# Patient Record
Sex: FEMALE | Race: OTHER | NOT HISPANIC OR LATINO | URBAN - METROPOLITAN AREA
[De-identification: names, ages, dates, MRNs, and addresses within clinical notes are randomized per-mention and may not be internally consistent; named-entity substitution may affect disease eponyms.]

---

## 2021-06-01 ENCOUNTER — INPATIENT (INPATIENT)
Facility: HOSPITAL | Age: 21
LOS: 6 days | Discharge: HOME | End: 2021-06-08
Attending: PSYCHIATRY & NEUROLOGY | Admitting: PSYCHIATRY & NEUROLOGY
Payer: MEDICAID

## 2021-06-01 VITALS
HEART RATE: 68 BPM | RESPIRATION RATE: 16 BRPM | SYSTOLIC BLOOD PRESSURE: 121 MMHG | TEMPERATURE: 98 F | DIASTOLIC BLOOD PRESSURE: 73 MMHG | OXYGEN SATURATION: 100 %

## 2021-06-01 DIAGNOSIS — F32.2 MAJOR DEPRESSIVE DISORDER, SINGLE EPISODE, SEVERE WITHOUT PSYCHOTIC FEATURES: ICD-10-CM

## 2021-06-01 LAB
ANION GAP SERPL CALC-SCNC: 13 MMOL/L — SIGNIFICANT CHANGE UP (ref 7–14)
APAP SERPL-MCNC: <15 UG/ML — SIGNIFICANT CHANGE UP (ref 15–25)
APPEARANCE UR: ABNORMAL
BACTERIA # UR AUTO: ABNORMAL
BASOPHILS # BLD AUTO: 0.03 K/UL — SIGNIFICANT CHANGE UP (ref 0–0.2)
BASOPHILS NFR BLD AUTO: 0.4 % — SIGNIFICANT CHANGE UP (ref 0–2)
BILIRUB UR-MCNC: NEGATIVE — SIGNIFICANT CHANGE UP
BUN SERPL-MCNC: 12 MG/DL — SIGNIFICANT CHANGE UP (ref 7–23)
CALCIUM SERPL-MCNC: 9.2 MG/DL — SIGNIFICANT CHANGE UP (ref 8.4–10.5)
CHLORIDE SERPL-SCNC: 104 MMOL/L — SIGNIFICANT CHANGE UP (ref 98–107)
CO2 SERPL-SCNC: 22 MMOL/L — SIGNIFICANT CHANGE UP (ref 22–31)
COLOR SPEC: YELLOW — SIGNIFICANT CHANGE UP
CREAT SERPL-MCNC: 0.64 MG/DL — SIGNIFICANT CHANGE UP (ref 0.5–1.3)
DIFF PNL FLD: ABNORMAL
EOSINOPHIL # BLD AUTO: 0.15 K/UL — SIGNIFICANT CHANGE UP (ref 0–0.5)
EOSINOPHIL NFR BLD AUTO: 2.1 % — SIGNIFICANT CHANGE UP (ref 0–6)
EPI CELLS # UR: SIGNIFICANT CHANGE UP /HPF (ref 0–5)
GLUCOSE SERPL-MCNC: 83 MG/DL — SIGNIFICANT CHANGE UP (ref 70–99)
GLUCOSE UR QL: NEGATIVE — SIGNIFICANT CHANGE UP
HCG SERPL-ACNC: <5 MIU/ML — SIGNIFICANT CHANGE UP
HCT VFR BLD CALC: 40.5 % — SIGNIFICANT CHANGE UP (ref 34.5–45)
HGB BLD-MCNC: 13.4 G/DL — SIGNIFICANT CHANGE UP (ref 11.5–15.5)
IANC: 4.21 K/UL — SIGNIFICANT CHANGE UP (ref 1.5–8.5)
IMM GRANULOCYTES NFR BLD AUTO: 0.3 % — SIGNIFICANT CHANGE UP (ref 0–1.5)
KETONES UR-MCNC: ABNORMAL
LEUKOCYTE ESTERASE UR-ACNC: NEGATIVE — SIGNIFICANT CHANGE UP
LYMPHOCYTES # BLD AUTO: 2.26 K/UL — SIGNIFICANT CHANGE UP (ref 1–3.3)
LYMPHOCYTES # BLD AUTO: 31 % — SIGNIFICANT CHANGE UP (ref 13–44)
MCHC RBC-ENTMCNC: 27.9 PG — SIGNIFICANT CHANGE UP (ref 27–34)
MCHC RBC-ENTMCNC: 33.1 GM/DL — SIGNIFICANT CHANGE UP (ref 32–36)
MCV RBC AUTO: 84.2 FL — SIGNIFICANT CHANGE UP (ref 80–100)
MONOCYTES # BLD AUTO: 0.63 K/UL — SIGNIFICANT CHANGE UP (ref 0–0.9)
MONOCYTES NFR BLD AUTO: 8.6 % — SIGNIFICANT CHANGE UP (ref 2–14)
NEUTROPHILS # BLD AUTO: 4.21 K/UL — SIGNIFICANT CHANGE UP (ref 1.8–7.4)
NEUTROPHILS NFR BLD AUTO: 57.6 % — SIGNIFICANT CHANGE UP (ref 43–77)
NITRITE UR-MCNC: NEGATIVE — SIGNIFICANT CHANGE UP
NRBC # BLD: 0 /100 WBCS — SIGNIFICANT CHANGE UP
NRBC # FLD: 0 K/UL — SIGNIFICANT CHANGE UP
PH UR: 6.5 — SIGNIFICANT CHANGE UP (ref 5–8)
PLATELET # BLD AUTO: 202 K/UL — SIGNIFICANT CHANGE UP (ref 150–400)
POTASSIUM SERPL-MCNC: 4.3 MMOL/L — SIGNIFICANT CHANGE UP (ref 3.5–5.3)
POTASSIUM SERPL-SCNC: 4.3 MMOL/L — SIGNIFICANT CHANGE UP (ref 3.5–5.3)
PROT UR-MCNC: ABNORMAL
RBC # BLD: 4.81 M/UL — SIGNIFICANT CHANGE UP (ref 3.8–5.2)
RBC # FLD: 13.2 % — SIGNIFICANT CHANGE UP (ref 10.3–14.5)
RBC CASTS # UR COMP ASSIST: SIGNIFICANT CHANGE UP /HPF (ref 0–4)
SALICYLATES SERPL-MCNC: <5 MG/DL — LOW (ref 15–30)
SODIUM SERPL-SCNC: 139 MMOL/L — SIGNIFICANT CHANGE UP (ref 135–145)
SP GR SPEC: 1.03 — HIGH (ref 1.01–1.02)
UROBILINOGEN FLD QL: ABNORMAL
WBC # BLD: 7.3 K/UL — SIGNIFICANT CHANGE UP (ref 3.8–10.5)
WBC # FLD AUTO: 7.3 K/UL — SIGNIFICANT CHANGE UP (ref 3.8–10.5)

## 2021-06-01 PROCEDURE — 99284 EMERGENCY DEPT VISIT MOD MDM: CPT | Mod: 25

## 2021-06-01 PROCEDURE — 99285 EMERGENCY DEPT VISIT HI MDM: CPT

## 2021-06-01 PROCEDURE — 93010 ELECTROCARDIOGRAM REPORT: CPT

## 2021-06-01 NOTE — ED ADULT TRIAGE NOTE - CHIEF COMPLAINT QUOTE
SI with no plan states attempted to cute herself with nailfile on a clipper two nights ago on her thigh, endorses only scratching herself, no active bleeding. Denies alcohol, drug abuse. Hx Social Anxiety, Depression
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
## 2021-06-01 NOTE — ED BEHAVIORAL HEALTH ASSESSMENT NOTE - OTHER PAST PSYCHIATRIC HISTORY (INCLUDE DETAILS REGARDING ONSET, COURSE OF ILLNESS, INPATIENT/OUTPATIENT TREATMENT)
Pt not currently in outpatient treatment. Has been dx with depression, anxiety and r/o bipolar disorder.  No past psych hospitalizations.  History of SI with preparatory actions and plan to OD on pills at age 14 - stopped herself when she thought about her family.  History of NSSIB ages [age] by cutting and again recently 2 days ago by cutting.  Pt has seen therapists on and off since age 15, last at age 19. Stopped because therapist wanted to explore idea of bipolar disorder, which scared her.  Pt evaluated by psychiatrists in the past (last at age 18), but was never on psychiatric medications as she declined. Pt not currently in outpatient treatment. Has been diagnosed with depression, anxiety and r/o bipolar disorder.  No past psych hospitalizations.  History of SI with preparatory actions and plan to OD on pills at age 14 - stopped herself when she thought about her family.  History of NSSIB ages [age] by cutting and again recently 2 days ago by cutting.  Pt has seen therapists on and off since age 15, last at age 19. Stopped because therapist wanted to explore idea of bipolar disorder, which scared her.  Pt evaluated by psychiatrists in the past (last at age 18), but was never on psychiatric medications as she declined.

## 2021-06-01 NOTE — ED ADULT NURSE NOTE - OBJECTIVE STATEMENT
pt came in via walk-in triage for c/o worsening depression anxiety, with recent self-injurious behavior. pt states she cut herself with a nail clipper recently after break up with boyfriend. denies si, states she does not want to die, just want to hurt herself. denies hi,ah,vh,etoh, drugs. states she want psych admission
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
## 2021-06-01 NOTE — CHART NOTE - NSCHARTNOTEFT_GEN_A_CORE
COVID Exposure Screen- Patient    1.	*In the past 14 days, have you been around anyone with a positive COVID-19 test?*   (  ) Yes   (X) No   (  ) Unknown- Reason (e.g. patient uncertain, sedated, refusing to answer, etc.):  ______  IF YES PROCEED TO QUESTION #2. IF NO or UNKNOWN, PLEASE SKIP TO QUESTION #7  2.	Were you within 6 feet of them for at least 15 minutes? (  ) Yes   (  ) No   (  ) Unknown- Reason: ______    3.	Have you provided care for them? (  ) Yes   (  ) No   (  ) Unknown- Reason: ______    4.	Have you had direct physical contact with them (touched, hugged, or kissed them)? (  ) Yes   (  ) No    (  ) Unknown- Reason: ______    5.	Have you shared eating or drinking utensils with them? (  ) Yes   (  ) No    (  ) Unknown- Reason: ______    6.	Have they sneezed, coughed, or somehow got respiratory droplets on you? (  ) Yes   (  ) No    (  ) Unknown- Reason: ______    7.	*Have you been out of [place] within the past 14 days?*  (  ) Yes   (X) No   (  ) Unknown- Reason (e.g. patient uncertain, sedated, refusing to answer, etc.): _______  IF YES PLEASE ANSWER THE FOLLOWING QUESTIONS:  8.	Which state/country have you been to? ______   9.	Were you there over 24 hours? (  ) Yes   (  ) No    (  ) Unknown- Reason: ______    10.	What date did you return to [facility]? ______         COVID Exposure Screen- collateral (i.e. third-party, chart review, belongings, etc; include EMS and ED staff)    1.	*In the past 14 days, has the patient been around anyone with a positive COVID-19 test?*   (  ) Yes   (X) No   (  ) Unknown- Reason (e.g. collateral uncertain, refusing to answer, etc.):  ______  IF YES PROCEED TO QUESTION #2. IF NO or UNKNOWN, PLEASE SKIP TO QUESTION #7  2.	Was the patient within 6 feet of them for at least 15 minutes? (  ) Yes   (  ) No   (  ) Unknown- Reason: ______    3.	Did the patient provide care for them? (  ) Yes   (  ) No   (  ) Unknown- Reason: ______    4.	Did the patient have direct physical contact with them (touched, hugged, or kissed them)? (  ) Yes   (  ) No    (  ) Unknown- Reason: ______    5.	Did the patient share eating or drinking utensils with them? (  ) Yes   (  ) No    (  ) Unknown- Reason: ______    6.	Have they sneezed, coughed, or somehow got respiratory droplets on the patient? (  ) Yes   (  ) No    (  ) Unknown- Reason: ______    7.	*Has the patient been out of [place] within the past 14 days?*  (  ) Yes   (X) No   (  ) Unknown- Reason (e.g. collateral uncertain, refusing to answer, etc.): _______  IF YES PLEASE ANSWER THE FOLLOWING QUESTIONS:  8.	Which state/country has the patient been to? ______   9.	Was the patient there over 24 hours? (  ) Yes   (  ) No    (  ) Unknown- Reason: ______    10.	What date did the patient return to [facility]? ______

## 2021-06-01 NOTE — ED BEHAVIORAL HEALTH ASSESSMENT NOTE - PSYCHIATRIC ISSUES AND PLAN (INCLUDE STANDING AND PRN MEDICATION)
Defer standing meds to primary team. Melatonin 3mg QHS PRN insomnia. Hydroxyzine 50mg q6h PRN anxiety. Ativan 2mg PO/IM q6h PRN severe anxiety/agitation.
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
## 2021-06-01 NOTE — ED PROVIDER NOTE - PROGRESS NOTE DETAILS
HANNA: I was signed out pending labs and medical clearance for admission to [facility] for SI. Covid NEG. HANNA: Pt has no complaints. Still awaiting bed availability at [facility]. Will continue to monitor. Cisco PERALES: Patient signed out pending psych admission. Plan to admit to [facility], 1[facility] - Dr. Kimberly Raymundo

## 2021-06-02 DIAGNOSIS — F32.9 MAJOR DEPRESSIVE DISORDER, SINGLE EPISODE, UNSPECIFIED: ICD-10-CM

## 2021-06-02 LAB
COVID-19 SPIKE DOMAIN AB INTERP: NEGATIVE — SIGNIFICANT CHANGE UP
COVID-19 SPIKE DOMAIN AB INTERP: NEGATIVE — SIGNIFICANT CHANGE UP
COVID-19 SPIKE DOMAIN ANTIBODY RESULT: 0.4 U/ML — SIGNIFICANT CHANGE UP
COVID-19 SPIKE DOMAIN ANTIBODY RESULT: 0.4 U/ML — SIGNIFICANT CHANGE UP
PCP SPEC-MCNC: SIGNIFICANT CHANGE UP
SARS-COV-2 IGG+IGM SERPL QL IA: 0.4 U/ML — SIGNIFICANT CHANGE UP
SARS-COV-2 IGG+IGM SERPL QL IA: 0.4 U/ML — SIGNIFICANT CHANGE UP
SARS-COV-2 IGG+IGM SERPL QL IA: NEGATIVE — SIGNIFICANT CHANGE UP
SARS-COV-2 IGG+IGM SERPL QL IA: NEGATIVE — SIGNIFICANT CHANGE UP
SARS-COV-2 RNA SPEC QL NAA+PROBE: SIGNIFICANT CHANGE UP

## 2021-06-02 PROCEDURE — 90832 PSYTX W PT 30 MINUTES: CPT

## 2021-06-02 PROCEDURE — 99213 OFFICE O/P EST LOW 20 MIN: CPT

## 2021-06-02 PROCEDURE — 99222 1ST HOSP IP/OBS MODERATE 55: CPT

## 2021-06-02 RX ORDER — LANOLIN ALCOHOL/MO/W.PET/CERES
3 CREAM (GRAM) TOPICAL AT BEDTIME
Refills: 0 | Status: DISCONTINUED | OUTPATIENT
Start: 2021-06-02 | End: 2021-06-03

## 2021-06-02 RX ORDER — ALBUTEROL 90 UG/1
2 AEROSOL, METERED ORAL EVERY 6 HOURS
Refills: 0 | Status: DISCONTINUED | OUTPATIENT
Start: 2021-06-02 | End: 2021-06-08

## 2021-06-02 RX ADMIN — Medication 3 MILLIGRAM(S): at 21:47

## 2021-06-02 NOTE — BH INPATIENT PSYCHIATRY ASSESSMENT NOTE - NSICDXBHPRIMARYDX_PSY_ALL_CORE
Current severe episode of major depressive disorder without psychotic features without prior episode   F32.2
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
## 2021-06-02 NOTE — BH INPATIENT PSYCHIATRY ASSESSMENT NOTE - OTHER PAST PSYCHIATRIC HISTORY (INCLUDE DETAILS REGARDING ONSET, COURSE OF ILLNESS, INPATIENT/OUTPATIENT TREATMENT)
Pt not currently in outpatient treatment. Has been diagnosed with depression, anxiety and r/o bipolar disorder.  No past psych hospitalizations.  History of SI with preparatory actions and plan to OD on pills at age 14 - stopped herself when she thought about her family.  History of NSSIB ages [age] by cutting and again recently 2 days ago by cutting.  Pt has seen therapists on and off since age 15, last at age 19. Stopped because therapist wanted to explore idea of bipolar disorder, which scared her.  Pt evaluated by psychiatrists in the past (last at age 18), but was never on psychiatric medications as she declined.

## 2021-06-02 NOTE — ED ADULT NURSE REASSESSMENT NOTE - NS ED NURSE REASSESS COMMENT FT1
Received pt from RN break coverage. Pt is sleeping in bed, NAD, even unlabored respirations observed. VSS. Will continue to monitor for safety.

## 2021-06-02 NOTE — ED ADULT NURSE REASSESSMENT NOTE - NS ED NURSE REASSESS COMMENT FT1
received pt from previous shift. pt is resting well in  room 4. still boarding for a psych bed. no issues noted.

## 2021-06-02 NOTE — BH CONSULTATION LIAISON PROGRESS NOTE - NSBHCHARTREVIEWVS_PSY_A_CORE FT
Vital Signs Last 24 Hrs  T(C): 36.8 (02 Jun 2021 00:59), Max: 36.8 (01 Jun 2021 17:25)  T(F): 98.2 (02 Jun 2021 00:59), Max: 98.2 (01 Jun 2021 17:25)  HR: 87 (02 Jun 2021 08:02) (68 - 87)  BP: 126/83 (02 Jun 2021 08:02) (99/55 - 126/83)  BP(mean): --  RR: 15 (02 Jun 2021 08:02) (14 - 16)  SpO2: 98% (02 Jun 2021 08:02) (98% - 100%)

## 2021-06-02 NOTE — BH INPATIENT PSYCHIATRY ASSESSMENT NOTE - CURRENT MEDICATION
MEDICATIONS  (STANDING):  melatonin. 3 milliGRAM(s) Oral at bedtime    MEDICATIONS  (PRN):  ALBUTerol    90 MICROgram(s) HFA Inhaler 2 Puff(s) Inhalation every 6 hours PRN Wheezing  LORazepam     Tablet 2 milliGRAM(s) Oral every 6 hours PRN Agitation  LORazepam   Injectable 2 milliGRAM(s) IntraMuscular once PRN severe agitation

## 2021-06-02 NOTE — BH INPATIENT PSYCHIATRY ASSESSMENT NOTE - NSBHASSESSSUMMFT_PSY_ALL_CORE
Patient presents with worsening depression, SI and thoughts to self-harm.  Patient is voluntary, happy to be coming in for treatment, denies any SI in the hospital.  The patient is agreeable for group and individual therapy, but is declining medications at this time.  -Continue to evaluate for need for medications  -Group and individual therapy

## 2021-06-02 NOTE — BH INPATIENT PSYCHIATRY ASSESSMENT NOTE - NSBHCHARTREVIEWVS_PSY_A_CORE FT
Vital Signs Last 24 Hrs  T(C): 36.6 (02 Jun 2021 13:24), Max: 36.8 (01 Jun 2021 17:25)  T(F): 97.8 (02 Jun 2021 13:24), Max: 98.2 (01 Jun 2021 17:25)  HR: 67 (02 Jun 2021 13:24) (67 - 87)  BP: 105/58 (02 Jun 2021 13:24) (99/55 - 126/83)  BP(mean): --  RR: 16 (02 Jun 2021 13:24) (14 - 16)  SpO2: 100% (02 Jun 2021 13:24) (98% - 100%)

## 2021-06-02 NOTE — BH INPATIENT PSYCHIATRY ASSESSMENT NOTE - MSE UNSTRUCTURED FT
The patient appears stated age, fair hygiene, dressed appropriately.  She was calm, cooperative with the interview.  She maintained appropriate eye contact.  No psychomotor agitation or retardation.  Steady gait.  The patient’s speech was fluent, normal in tone, rate, and volume.  The patient’s mood is “depressed.”  Affect is constricted, dysphoric, stable, appropriate.  The patient’s thoughts are goal directed.  She denies any delusions or hallucinations.  She admits to recent suicidal and self-injurious thoughts, denies any current plan or intent in the hospital.  No homicidal ideation, intent, or plan.  Insight is fair.  Judgment is fair.  Impulse control has been fair on the unit.
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
## 2021-06-02 NOTE — BH INPATIENT PSYCHIATRY ASSESSMENT NOTE - NSTXRELFACTOR_PSY_ALL_CORE
I, Teto Garibay MD,  performed the initial face to face bedside interview with this patient regarding history of present illness, review of symptoms and relevant past medical, social and family history.  I completed an independent physical examination.  I was the initial provider who evaluated this patient. I have signed out the follow up of any pending tests (i.e. labs, radiological studies) to the ACP.  I have communicated the patient’s plan of care and disposition with the ACP. Non-compliant or not receiving treatment

## 2021-06-03 PROCEDURE — 99232 SBSQ HOSP IP/OBS MODERATE 35: CPT

## 2021-06-03 PROCEDURE — 90853 GROUP PSYCHOTHERAPY: CPT

## 2021-06-03 RX ORDER — LANOLIN ALCOHOL/MO/W.PET/CERES
5 CREAM (GRAM) TOPICAL AT BEDTIME
Refills: 0 | Status: DISCONTINUED | OUTPATIENT
Start: 2021-06-03 | End: 2021-06-08

## 2021-06-03 RX ORDER — MULTIVIT-MIN/FERROUS GLUCONATE 9 MG/15 ML
1 LIQUID (ML) ORAL DAILY
Refills: 0 | Status: DISCONTINUED | OUTPATIENT
Start: 2021-06-03 | End: 2021-06-08

## 2021-06-03 RX ADMIN — Medication 5 MILLIGRAM(S): at 20:46

## 2021-06-03 NOTE — DIETITIAN INITIAL EVALUATION ADULT. - OTHER INFO
Patient admitted to [facility] d/t depression/anxiety. Patient states she feels hungry but does not have an appetite. Patient admitted to [facility] d/t depression/anxiety. Patient states she feels hungry but has not had an appetite. Reports her appetite has somewhat improved and consumed a good amount of breakfast this morning. Reports she had weighed 150# last year and had some weight loss ; and then lost 20# in the past few months. Reports weighs currently 104#. States she has constipation; encouraged fiber and fluid intake. Food preferences obtained and implemented.
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
## 2021-06-03 NOTE — BH SOCIAL WORK INITIAL PSYCHOSOCIAL EVALUATION - NSBHHOUSECOMMENTFT_PSY_ALL_CORE
Patient lives with her mother, grandmother, siblings, father recently went to [facility] and it is unclear if he will return.  None of the issues below were reported.

## 2021-06-03 NOTE — BH PSYCHOLOGY - GROUP THERAPY NOTE - NSPSYCHOLGRPDBTPT_PSY_A_CORE
stable mood and affect in group/patient showing good behavior control/Patient able to identify mood states/other...

## 2021-06-04 PROCEDURE — 99232 SBSQ HOSP IP/OBS MODERATE 35: CPT

## 2021-06-04 PROCEDURE — 90834 PSYTX W PT 45 MINUTES: CPT

## 2021-06-04 RX ADMIN — Medication 1 TABLET(S): at 08:40

## 2021-06-04 RX ADMIN — Medication 5 MILLIGRAM(S): at 21:53

## 2021-06-04 NOTE — BH TREATMENT PLAN - NSBHPRIMARYDX_PSY_ALL_CORE
Current severe episode of major depressive disorder without psychotic features without prior episode

## 2021-06-04 NOTE — BH INPATIENT PSYCHIATRY PROGRESS NOTE - MSE UNSTRUCTURED FT
The patient appears stated age, fair hygiene, dressed appropriately.  She was calm, cooperative with the interview.  She maintained appropriate eye contact.  No psychomotor agitation or retardation.  Steady gait.  The patient’s speech was fluent, normal in tone, rate, and volume.  The patient’s mood is “a little better.”  Affect is constricted, stable, appropriate.  The patient’s thoughts are goal directed.  She denies any delusions or hallucinations.  She admits to recent suicidal and self-injurious thoughts, denies any current plan or intent in the hospital.  No homicidal ideation, intent, or plan.  Insight is fair.  Judgment is fair.  Impulse control has been fair on the unit.

## 2021-06-05 PROCEDURE — 99232 SBSQ HOSP IP/OBS MODERATE 35: CPT

## 2021-06-05 RX ORDER — HYDROXYZINE HCL 10 MG
25 TABLET ORAL EVERY 4 HOURS
Refills: 0 | Status: DISCONTINUED | OUTPATIENT
Start: 2021-06-05 | End: 2021-06-08

## 2021-06-05 RX ORDER — HYDROXYZINE HCL 10 MG
50 TABLET ORAL ONCE
Refills: 0 | Status: COMPLETED | OUTPATIENT
Start: 2021-06-05 | End: 2021-06-05

## 2021-06-05 RX ADMIN — Medication 50 MILLIGRAM(S): at 15:26

## 2021-06-05 RX ADMIN — Medication 1 TABLET(S): at 08:55

## 2021-06-05 NOTE — BH INPATIENT PSYCHIATRY PROGRESS NOTE - MSE UNSTRUCTURED FT
The patient appears stated age, fair hygiene, dressed appropriately.  She was calm, cooperative with the interview.  She maintained appropriate eye contact.  No psychomotor agitation or retardation.   The patient’s speech was fluent, normal in tone, rate, and volume.  mood is “a little better.”  Affect is constricted, stable, appropriate.  The patient’s thoughts are goal directed.  She denies any delusions or hallucinations.  She admits to recent suicidal and self-injurious thoughts, denies any current plan or intent in the hospital.  No homicidal ideation, intent, or plan.  Insight is fair.  Judgment is fair.  Impulse control has been good .

## 2021-06-06 PROCEDURE — 99232 SBSQ HOSP IP/OBS MODERATE 35: CPT

## 2021-06-06 RX ADMIN — Medication 5 MILLIGRAM(S): at 21:11

## 2021-06-06 RX ADMIN — Medication 1 TABLET(S): at 09:55

## 2021-06-06 RX ADMIN — Medication 25 MILLIGRAM(S): at 21:12

## 2021-06-06 NOTE — BH INPATIENT PSYCHIATRY PROGRESS NOTE - MSE UNSTRUCTURED FT
The patient appears stated age, fair hygiene, dressed appropriately.  She was calm, cooperative with the interview.  She maintained appropriate eye contact.  No psychomotor agitation or retardation.   The patient’s speech was fluent, normal in tone, rate, and volume.  mood is “a little better.”  Affect is constricted, stable, appropriate.  The patient’s thoughts are goal directed.  She denies any delusions or hallucinations.  She admits to recent suicidal and self-injurious thoughts, denies any current plan or intent in the hospital.  No homicidal ideation, intent, or plan.  Insight is fair.  Judgment is fair.  Impulse control has been good .  The patient appears stated age, fair hygiene, dressed appropriately.  She was calm, cooperative with the interview.  She maintained appropriate eye contact.  No psychomotor agitation or retardation.   The patient’s speech was fluent, normal in tone, rate, and volume.  mood remains “ better.”  Affect is constricted, stable, appropriate.  The patient’s thoughts are goal directed.  She denies any delusions or hallucinations.  She admits to recent suicidal and self-injurious thoughts, denies any current plan or intent in the hospital.  No homicidal ideation, intent, or plan.  Insight is fair.  Judgment is fair.  Impulse control has been good .

## 2021-06-07 PROCEDURE — 90853 GROUP PSYCHOTHERAPY: CPT

## 2021-06-07 PROCEDURE — 90832 PSYTX W PT 30 MINUTES: CPT | Mod: 59

## 2021-06-07 PROCEDURE — 99232 SBSQ HOSP IP/OBS MODERATE 35: CPT | Mod: 25

## 2021-06-07 RX ADMIN — Medication 1 TABLET(S): at 10:18

## 2021-06-07 RX ADMIN — Medication 5 MILLIGRAM(S): at 21:23
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
## 2021-06-07 NOTE — BH PSYCHOLOGY - CLINICIAN PSYCHOTHERAPY NOTE - NSBHPSYCHOLGOALS_PSY_A_CORE
Decrease symptoms/Improve level of independent functioning/Improve social/vocational/coping skills/Prevent relapse/Psychoeducation/Treatment compliance
Decrease symptoms/Improve level of independent functioning/Improve social/vocational/coping skills/Prevent relapse/Psychoeducation
Decrease symptoms/Assessment/Improve level of independent functioning/Improve social/vocational/coping skills/Prevent relapse/Psychoeducation/Treatment compliance

## 2021-06-07 NOTE — BH PSYCHOLOGY - CLINICIAN PSYCHOTHERAPY NOTE - NSBHPSYCHOLINT_PSY_A_CORE
Dialectical  Behavioral Therapy (DBT)/Supported coping skills/Treatment compliance encouraged
Dialectical  Behavioral Therapy (DBT)/Supported coping skills
Dialectical  Behavioral Therapy (DBT)/Supported coping skills

## 2021-06-07 NOTE — BH PSYCHOLOGY - GROUP THERAPY NOTE - NSPSYCHOLGRPDBTPT_PSY_A_CORE FT
DBT Group is a group in which patients learn skills to better manage their emotions and behaviors. Group begins with a mindfulness practice so that patients have an opportunity to practice observing their internal and external experiences.  Following the mindfulness exercise the group learns new skills in a didactic format. Group today focused on the “distress tolerance” module, which are skills to help patients manage their crisis urges.  Specifically, pts learned the skill of “radical acceptance,” which includes accepting painful situations in their lives they cannot change through turning the mind and practicing willingness. Patients were asked to determine difficult situations in their lives they needed to work on accepting, and provided examples of ways to practice this while in the hospital.    
DBT Group is a group in which patients learn skills to better manage their emotions and behaviors. Group begins with a mindfulness practice so that patients have an opportunity to practice observing their internal and external experiences.  Following the mindfulness exercise the group learns new skills in a didactic format. Group today focused on the “emotion regulation” module.  Specifically, patients learned about the skill of Problem Solving, which is a method of managing difficult situations when an emotion is justified by the situation.  Patients were taught the seven steps of problem solving and provided with an example of a situation in which the skill would be useful.  Patients were also asked to think about when the skill would be helpful in their lives and how to apply the steps. Patients were also provided with a worksheet in order to help them practice the skill.

## 2021-06-07 NOTE — BH PSYCHOLOGY - GROUP THERAPY NOTE - NSPSYCHOLGRPDBTGOAL_PSY_A_CORE
reduce mood and affective lability/reduce impulsive self-defeating behavior/reduce self-injurious behavior/reduce suicidal ideation/risk of attempt/improve ability to indentify feelings/improve ability to communicate feelings
reduce impulsive self-defeating behavior/reduce suicidal ideation/risk of attempt/improve ability to indentify feelings/improve ability to communicate feelings/reduce vulnerability to emotional dysregualation

## 2021-06-07 NOTE — BH PSYCHOLOGY - CLINICIAN PSYCHOTHERAPY NOTE - NSBHPSYCHOLNARRATIVE_PSY_A_CORE FT
Writer met with patient for individual therapy session at the recommendation of the treatment team. Pt was alert, oriented x4 and cooperative with session. Pt denied SHIIP and AVH. Pt reports mood as "okay" and affect is euthymic, congruent and full-range. No PMA or PMR is observed and speech is of normal rate, rhythm and volume. Pt maintains appropriate eye contact. Impulse control is intact and pt's insight and judgment are limited. Focus of session was on generating safety plan and coping ahead for effective discharge. With encouragement, pt identified warning signs, coping skills, and external supports that she can use to maintain safety and stability outside of the hospital after discharge. Discussion also centered on how pt can help keep the environment safe and she committed to giving sharps to her mother to remove triggers in her environment. Please see  Safety Plan for further detail.    
Writer met with patient for individual therapy session at the recommendation of the treatment team. Pt was alert, oriented x4 and cooperative with session. Pt denied HIIP and AVH. Pt endorses passive SI and denies plan or intent. Pt endorses mild SIB urges with no plan or intent. Pt reports mood as "depressed" and affect is congruent, tearful as times appropriate to content. Some PMA is observed and speech is of normal rate, rhythm and volume. Pt maintains appropriate eye contact. Impulse control is intact and pt's insight and judgment are limited. Focus of session is on conducting behavior chain analysis on events leading to current hospitalization. Pt shares that she recently ended a long toxic relationship with boyfriend with whom she became pregnant and had two abortions as a result, one of which was within last couple of months. Pt shares that they often argue and that recently she has been unable to cope with the frustration, guilt and shame she experiences. Pt endorses history of SIB via cutting, head-banging and punching things which required medical attention one time due to ruptures blood vessel in her hand. Pt explains that she had an argument with bf a couple of days before coming to the hospital which led to passive SI. Validation, psychoeducation and solution analysis are implemented. Pt is apprised of CAPS self-harm 1 [facility] protocol and commits to safety on the unit. Pt is oriented to DBT Diary Card and agrees to track urges, target behaviors and use of DBT skills.      
Writer met with patient for individual therapy session at the recommendation of the treatment team. Pt was alert, oriented x4 and cooperative with session. Pt denied SHIIP and AVH. She endorses passive SIB urges and states that if she were home she likely would have acted on these. Pt reports mood as "sad" and affect is congruent and full-range. No PMA or PMR is observed and speech is of normal rate, rhythm and volume. Pt maintains appropriate eye contact. Impulse control is intact and pt's insight and judgment are limited. Focus of session is on reviewing diary card and further conducting chain analysis on events leading to hospitalization. Pt shares details of her relationship with ex-bf, who she spoke to on the phone on Weds and Thursday after which conversations she had SIB urges. Pt discusses pattern of relationship by which they begin dating, he cheats on her, she becomes angry with him and they break up, he apologizes and makes new overtures, she takes him back and it continues as such. Pt shares that she often feels guilty and confused, or as though, "there must be something wrong with me which is why he is not faithful." Pt discusses feeling judgmental of herself and states that she feels "addicted" to the ex. Validation and psychoeducation about healthy relationships, boundaries, and trauma bonds are provided. Skills that pt can use to regulate herself when she feels rejected or angry are discussed and practiced. These include self-validation, radical acceptance, opposite action, diaphragmatic breathing and mindfully observing.

## 2021-06-07 NOTE — BH INPATIENT PSYCHIATRY PROGRESS NOTE - MSE UNSTRUCTURED FT
The patient appears stated age, fair hygiene, dressed appropriately.  She was calm, cooperative with the interview.  She maintained appropriate eye contact.  No psychomotor agitation or retardation.   The patient’s speech was fluent, normal in tone, rate, and volume.  mood remains “ better” and "I want discharge."  Affect is constricted, stable, appropriate.  The patient’s thoughts are goal directed.  She denies any delusions or hallucinations.  She adamantly denies any suicidal or homicidal ideation, intent, or plan.  Insight is fair.  Judgment is fair.  Impulse control has been good .

## 2021-06-08 VITALS — DIASTOLIC BLOOD PRESSURE: 75 MMHG | HEART RATE: 72 BPM | TEMPERATURE: 98 F | SYSTOLIC BLOOD PRESSURE: 115 MMHG

## 2021-06-08 PROCEDURE — 99238 HOSP IP/OBS DSCHRG MGMT 30/<: CPT

## 2021-06-08 RX ADMIN — Medication 1 TABLET(S): at 08:45

## 2021-06-08 NOTE — BH INPATIENT PSYCHIATRY PROGRESS NOTE - MSE OPTIONS
Unstructured MSE

## 2021-06-08 NOTE — BH DISCHARGE NOTE NURSING/SOCIAL WORK/PSYCH REHAB - MODE OF TRANSPORTATION
Ambulatory Pt will be picked up by family taken back to [address] [place] [address], [place]/Ambulatory

## 2021-06-08 NOTE — BH INPATIENT PSYCHIATRY DISCHARGE NOTE - CASE SUMMARY
Agree with above hospital course.  Patient refused medications but was engaged in therapy and showed improvement in symptoms.  No SI.  No longer an acute danger to herself or others, discharged on 3 day letter.

## 2021-06-08 NOTE — BH INPATIENT PSYCHIATRY PROGRESS NOTE - MSE UNSTRUCTURED FT
The patient appears stated age, fair hygiene, dressed appropriately.  She was calm, cooperative with the interview.  She maintained appropriate eye contact.  No psychomotor agitation or retardation.   The patient’s speech was fluent, normal in tone, rate, and volume.  mood remains “ better”   Affect is brighter, stable, appropriate.  The patient’s thoughts are goal directed.  She denies any delusions or hallucinations.  She adamantly denies any suicidal or homicidal ideation, intent, or plan.  Insight is fair.  Judgment is fair.  Impulse control has been good .

## 2021-06-08 NOTE — BH INPATIENT PSYCHIATRY PROGRESS NOTE - NSTXDCOPLKGOAL_PSY_ALL_CORE
Will agree to consider an appropriate level of outpatient care
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
## 2021-06-08 NOTE — BH INPATIENT PSYCHIATRY DISCHARGE NOTE - NSBHDCHANDOFFFT_PSY_ALL_CORE
Pt referred to [facility] and has not yet been assigned a provider. Clinic was faxed discharge summary, medication list, and this provider's number [phone] for any questions or concerns

## 2021-06-08 NOTE — BH INPATIENT PSYCHIATRY PROGRESS NOTE - NSTXSUICIDGOAL_PSY_ALL_CORE
Will identify and utilize 2 coping skills
Will develop a suicide prevention/safety plan

## 2021-06-08 NOTE — BH DISCHARGE NOTE NURSING/SOCIAL WORK/PSYCH REHAB - NSDCPRRECOMMEND_PSY_ALL_CORE
Writer recommends pt to follow up with Outpatient care with [facility] with medication, group therapy attendance.

## 2021-06-08 NOTE — BH DISCHARGE NOTE NURSING/SOCIAL WORK/PSYCH REHAB - DISCHARGE INSTRUCTIONS AFTERCARE APPOINTMENTS
In order to check the location, date, or time of your aftercare appointment, please refer to your Discharge Instructions Document given to you upon leaving the hospital.  If you have lost the instructions please call [phone]
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
## 2021-06-08 NOTE — BH DISCHARGE NOTE NURSING/SOCIAL WORK/PSYCH REHAB - NSBHREFERPURPOSE1_PSY_ALL_CORE
Mental Health Treatment Please arrive to your appointment with your identification card and insurance. Arrive on time./Mental Health Treatment

## 2021-06-08 NOTE — BH INPATIENT PSYCHIATRY PROGRESS NOTE - NSBHFUPINTERVALHXFT_PSY_A_CORE
Patient seen for follow up for depression, chart reviewed, and case discussed with treatment team.  No events reported overnight.  The patient slept a little better last night, still with some middle insomnia, but better than the night before.  She continues to feel depressed, with some suicidal thinking, but she denies any intent or plan in the hospital.  She feels a little better.  Behavior has continued to be well controlled.  The patient has been visible on the unit, social with peers, and attending groups, which she finds helpful.  She reports eating well.
Reading a book alone in the day room; states she is here to rest a bit; wants Atarax for anxiety; no s/h i/i/p or avh; anxious but polite; affect anxious and sad thought congruent; fair eye contact; good adls; sad; no peculiarities but slightly guarded;  ij: fair to poor
Patient seen for follow up for depression, chart reviewed, and case discussed with treatment team.  No events reported overnight.  Pt reports improved sleep, fair appetite.  She states she feels better and submitted a 3DL on 6/5/21 for discharge. Pt states that she has been using coping mechanisms learned in group to deal with her anxiety. Pt denies SIIP, HIIP, A/VH, or paranoia. Behavior has continued to be well controlled. Pt educated on the use of 911 or going to the nearest ED if safety concerns should arise in the community; pt verbalized understanding. SW contacted pt's family and they did not express any safety concerns re: discharge. Pt was discharged to their care.
Patient seen for follow up for depression, chart reviewed, and case discussed with treatment team.  No events reported overnight.  The patient did not sleep well, with middle insomnia.  She continues to feel depressed, with suicidal thoughts, but she denies any intent or plan in the hospital.  Behavior has been well controlled.  The patient has started to engage well on the unit, attending two groups already today.  She has found them to be helpful so far.  She has been visible on the unit, and engaged.  She reports eating well.
Patient seen for follow up for depression, chart reviewed, and case discussed with treatment team.  No events reported overnight, staff reports she is self isolative this weekend.  Pt reports improved sleep, fair appetite.  She states she feels a little better and submitted a 3DL on 6/5/21 for discharge. Pt continues to refuse medication interventions despite education and encouragement provided. PHP discussed at length and pt is declining at this time. Pt states that she "came here to unplug" from her phone and social stressors. Pt states that she has blocked her boyfriend from her phone and has asked her sister to change her phone number as well. Pt states that she has been using coping mechanisms learned in group to deal with her anxiety and though she states PO PRN atarax helped her over the weekend, she has decided to not take any medications and use coping skills instead. Pt denies SIIP, HIIP, A/VH, or paranoia. Behavior has continued to be well controlled. Pt agreeable to referral to outpt tx, but states that she understands that if she is unable to get the appt by tomorrow, she would still like to be discharged home. 
sitting eating lunch alone in room; book open; talks about not being very social yet patient is pleasant and engaging; sad but not endorsing SI.

## 2021-06-08 NOTE — BH INPATIENT PSYCHIATRY PROGRESS NOTE - CURRENT MEDICATION
MEDICATIONS  (STANDING):  melatonin. 5 milliGRAM(s) Oral at bedtime  multivitamin/minerals 1 Tablet(s) Oral daily    MEDICATIONS  (PRN):  ALBUTerol    90 MICROgram(s) HFA Inhaler 2 Puff(s) Inhalation every 6 hours PRN Wheezing  LORazepam     Tablet 2 milliGRAM(s) Oral every 6 hours PRN Agitation  LORazepam   Injectable 2 milliGRAM(s) IntraMuscular once PRN severe agitation  
MEDICATIONS  (STANDING):  melatonin. 5 milliGRAM(s) Oral at bedtime  multivitamin/minerals 1 Tablet(s) Oral daily    MEDICATIONS  (PRN):  ALBUTerol    90 MICROgram(s) HFA Inhaler 2 Puff(s) Inhalation every 6 hours PRN Wheezing  hydrOXYzine hydrochloride 25 milliGRAM(s) Oral every 4 hours PRN anxiety  LORazepam     Tablet 2 milliGRAM(s) Oral every 6 hours PRN Agitation  LORazepam   Injectable 2 milliGRAM(s) IntraMuscular once PRN severe agitation  
MEDICATIONS  (STANDING):  melatonin. 5 milliGRAM(s) Oral at bedtime  multivitamin/minerals 1 Tablet(s) Oral daily    MEDICATIONS  (PRN):  ALBUTerol    90 MICROgram(s) HFA Inhaler 2 Puff(s) Inhalation every 6 hours PRN Wheezing  hydrOXYzine hydrochloride 25 milliGRAM(s) Oral every 4 hours PRN anxiety  LORazepam     Tablet 2 milliGRAM(s) Oral every 6 hours PRN Agitation  LORazepam   Injectable 2 milliGRAM(s) IntraMuscular once PRN severe agitation  
MEDICATIONS  (STANDING):  melatonin. 5 milliGRAM(s) Oral at bedtime    MEDICATIONS  (PRN):  ALBUTerol    90 MICROgram(s) HFA Inhaler 2 Puff(s) Inhalation every 6 hours PRN Wheezing  LORazepam     Tablet 2 milliGRAM(s) Oral every 6 hours PRN Agitation  LORazepam   Injectable 2 milliGRAM(s) IntraMuscular once PRN severe agitation  
MEDICATIONS  (STANDING):  melatonin. 5 milliGRAM(s) Oral at bedtime  multivitamin/minerals 1 Tablet(s) Oral daily    MEDICATIONS  (PRN):  ALBUTerol    90 MICROgram(s) HFA Inhaler 2 Puff(s) Inhalation every 6 hours PRN Wheezing  hydrOXYzine hydrochloride 25 milliGRAM(s) Oral every 4 hours PRN anxiety  LORazepam     Tablet 2 milliGRAM(s) Oral every 6 hours PRN Agitation  LORazepam   Injectable 2 milliGRAM(s) IntraMuscular once PRN severe agitation  
MEDICATIONS  (STANDING):  melatonin. 5 milliGRAM(s) Oral at bedtime  multivitamin/minerals 1 Tablet(s) Oral daily    MEDICATIONS  (PRN):  ALBUTerol    90 MICROgram(s) HFA Inhaler 2 Puff(s) Inhalation every 6 hours PRN Wheezing  LORazepam     Tablet 2 milliGRAM(s) Oral every 6 hours PRN Agitation  LORazepam   Injectable 2 milliGRAM(s) IntraMuscular once PRN severe agitation

## 2021-06-08 NOTE — BH INPATIENT PSYCHIATRY PROGRESS NOTE - NSTXDEPRESINTERMD_PSY_ALL_CORE
Group and individual therapy

## 2021-06-08 NOTE — BH INPATIENT PSYCHIATRY PROGRESS NOTE - NSBHASSESSSUMMFT_PSY_ALL_CORE
The patient continues to be depressed with suicidal ideation, but she is showing improvement.  She is declining medications, but engaging well in therapy on the unit.  -Continue group and individual therapy
The patient now denying any SIIP, HIIP, A/VH, or paranoia. Pt discharge to her family's care 
The patient continues to be depressed with suicidal ideation.  She is declining medications, but engaging well in therapy on the unit.  -Continue group and individual therapy
The patient now denying any SIIP, HIIP, A/VH, or paranoia. Pt submitted a 3DL on 6/5/21.  She is declining medications, but engaging well in therapy on the unit.    -Continue group and individual therapy  -possible d/c on 3DL tomorrow 
The patient continues to be depressed with suicidal ideation, but she is showing improvement.  She is declining medications, but engaging well in therapy on the unit.  -Continue group and individual therapy
The patient continues to be depressed with suicidal ideation, but she is showing improvement.  She is declining medications, but engaging well in therapy on the unit.  -Continue group and individual therapy

## 2021-06-08 NOTE — BH INPATIENT PSYCHIATRY PROGRESS NOTE - NSBHCONSULTIPREASON_PSY_A_CORE
danger to self; mental illness expected to respond to inpatient care

## 2021-06-08 NOTE — BH INPATIENT PSYCHIATRY PROGRESS NOTE - NSBHATTESTSEENBY_PSY_A_CORE
attending Psychiatrist without NP/Trainee
attending Psychiatrist without NP/Trainee
NP without Attending Psychiatrist
attending Psychiatrist without NP/Trainee
NP without Attending Psychiatrist
attending Psychiatrist without NP/Trainee

## 2021-06-08 NOTE — BH INPATIENT PSYCHIATRY PROGRESS NOTE - NSBHCHARTREVIEWVS_PSY_A_CORE FT
Vital Signs Last 24 Hrs  T(C): 36.7 (08 Jun 2021 07:48), Max: 36.8 (07 Jun 2021 20:45)  T(F): 98.1 (08 Jun 2021 07:48), Max: 98.3 (07 Jun 2021 20:45)  HR: 72 (08 Jun 2021 07:48) (72 - 72)  BP: 115/75 (08 Jun 2021 07:48) (115/75 - 115/75)  BP(mean): --  RR: --  SpO2: --
Vital Signs Last 24 Hrs  T(C): 37.2 (05 Jun 2021 20:19), Max: 37.2 (05 Jun 2021 20:19)  T(F): 98.9 (05 Jun 2021 20:19), Max: 98.9 (05 Jun 2021 20:19)  HR: 88 (05 Jun 2021 07:01) (88 - 88)  BP: 92/78 (05 Jun 2021 07:01) (92/78 - 92/78)  BP(mean): --  RR: --  SpO2: --
Vital Signs Last 24 Hrs  T(C): 36.9 (06 Jun 2021 07:55), Max: 37.2 (05 Jun 2021 20:19)  T(F): 98.5 (06 Jun 2021 07:55), Max: 98.9 (05 Jun 2021 20:19)  HR: 70 (06 Jun 2021 07:55) (70 - 70)  BP: 90/65 (06 Jun 2021 07:55) (90/65 - 90/65)  BP(mean): --  RR: --  SpO2: --
Vital Signs Last 24 Hrs  T(C): 36.6 (07 Jun 2021 08:21), Max: 37.1 (06 Jun 2021 20:38)  T(F): 97.8 (07 Jun 2021 08:21), Max: 98.8 (06 Jun 2021 20:38)  HR: 78 (07 Jun 2021 08:21) (78 - 78)  BP: 125/75 (07 Jun 2021 08:21) (125/75 - 125/75)  BP(mean): --  RR: --  SpO2: --
Vital Signs Last 24 Hrs  T(C): 36.7 (04 Jun 2021 07:46), Max: 36.8 (03 Jun 2021 19:55)  T(F): 98 (04 Jun 2021 07:46), Max: 98.3 (03 Jun 2021 19:55)  HR: --  BP: --  BP(mean): --  RR: --  SpO2: --
Vital Signs Last 24 Hrs  T(C): 36.7 (03 Jun 2021 07:55), Max: 36.7 (03 Jun 2021 07:55)  T(F): 98.1 (03 Jun 2021 07:55), Max: 98.1 (03 Jun 2021 07:55)  HR: 67 (02 Jun 2021 13:24) (67 - 67)  BP: 105/58 (02 Jun 2021 13:24) (105/58 - 105/58)  BP(mean): --  RR: 16 (02 Jun 2021 13:24) (16 - 16)  SpO2: 100% (02 Jun 2021 13:24) (100% - 100%)

## 2021-06-08 NOTE — BH INPATIENT PSYCHIATRY PROGRESS NOTE - NSBHMETABOLIC_PSY_ALL_CORE_FT
BMI:   HbA1c:   Glucose:   BP: 92/78 (06-05-21 @ 07:01) (92/78 - 92/78)  Lipid Panel: 
BMI:   HbA1c:   Glucose:   BP: 105/58 (06-02-21 @ 13:24) (99/55 - 126/83)  Lipid Panel: 
BMI:   HbA1c:   Glucose:   BP: 115/75 (06-08-21 @ 07:48) (90/65 - 125/75)  Lipid Panel: 
BMI:   HbA1c:   Glucose:   BP: 105/58 (06-02-21 @ 13:24) (99/55 - 126/83)  Lipid Panel: 
BMI:   HbA1c:   Glucose:   BP: 90/65 (06-06-21 @ 07:55) (90/65 - 92/78)  Lipid Panel: 
BMI:   HbA1c:   Glucose:   BP: 125/75 (06-07-21 @ 08:21) (90/65 - 125/75)  Lipid Panel:
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
## 2021-06-08 NOTE — BH INPATIENT PSYCHIATRY PROGRESS NOTE - NSTXPROBDEPRES_PSY_ALL_CORE
DEPRESSIVE SYMPTOMS

## 2021-06-08 NOTE — BH DISCHARGE NOTE NURSING/SOCIAL WORK/PSYCH REHAB - PATIENT PORTAL LINK FT
You can access the FollowMyHealth Patient Portal offered by [facility] by registering at the following website: [email or web address]. By joining [employer]’s FollowMyHealth portal, you will also be able to view your health information using other applications (apps) compatible with our system.
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
## 2021-06-08 NOTE — BH INPATIENT PSYCHIATRY PROGRESS NOTE - NSTXPROBSUICID_PSY_ALL_CORE
SUICIDE/SELF-INJURIOUS BEHAVIOR

## 2021-06-08 NOTE — BH INPATIENT PSYCHIATRY PROGRESS NOTE - NSBHINPTBILLING_PSY_ALL_CORE
58475 - Inpatient Moderate Complexity
97297 - Inpatient Moderate Complexity
71722 - Inpatient Moderate Complexity
76168 - Inpatient Moderate Complexity
41817 - Hospital Discharge Day Management; 30 min or less
30597 - Inpatient Moderate Complexity

## 2021-06-08 NOTE — BH DISCHARGE NOTE NURSING/SOCIAL WORK/PSYCH REHAB - NSBHDCAGENCY1FT_PSY_A_CORE
[facility] in [place] IN PERSON APPOINTMENT [facility] in [place] IN PERSON APPOINTMENT  Therapist: Irma Gallagher

## 2021-06-08 NOTE — BH INPATIENT PSYCHIATRY PROGRESS NOTE - PRN MEDS
MEDICATIONS  (PRN):  ALBUTerol    90 MICROgram(s) HFA Inhaler 2 Puff(s) Inhalation every 6 hours PRN Wheezing  hydrOXYzine hydrochloride 25 milliGRAM(s) Oral every 4 hours PRN anxiety  LORazepam     Tablet 2 milliGRAM(s) Oral every 6 hours PRN Agitation  LORazepam   Injectable 2 milliGRAM(s) IntraMuscular once PRN severe agitation  
MEDICATIONS  (PRN):  ALBUTerol    90 MICROgram(s) HFA Inhaler 2 Puff(s) Inhalation every 6 hours PRN Wheezing  LORazepam     Tablet 2 milliGRAM(s) Oral every 6 hours PRN Agitation  LORazepam   Injectable 2 milliGRAM(s) IntraMuscular once PRN severe agitation  
MEDICATIONS  (PRN):  ALBUTerol    90 MICROgram(s) HFA Inhaler 2 Puff(s) Inhalation every 6 hours PRN Wheezing  LORazepam     Tablet 2 milliGRAM(s) Oral every 6 hours PRN Agitation  LORazepam   Injectable 2 milliGRAM(s) IntraMuscular once PRN severe agitation  
MEDICATIONS  (PRN):  ALBUTerol    90 MICROgram(s) HFA Inhaler 2 Puff(s) Inhalation every 6 hours PRN Wheezing  hydrOXYzine hydrochloride 25 milliGRAM(s) Oral every 4 hours PRN anxiety  LORazepam     Tablet 2 milliGRAM(s) Oral every 6 hours PRN Agitation  LORazepam   Injectable 2 milliGRAM(s) IntraMuscular once PRN severe agitation  
MEDICATIONS  (PRN):  ALBUTerol    90 MICROgram(s) HFA Inhaler 2 Puff(s) Inhalation every 6 hours PRN Wheezing  LORazepam     Tablet 2 milliGRAM(s) Oral every 6 hours PRN Agitation  LORazepam   Injectable 2 milliGRAM(s) IntraMuscular once PRN severe agitation  
MEDICATIONS  (PRN):  ALBUTerol    90 MICROgram(s) HFA Inhaler 2 Puff(s) Inhalation every 6 hours PRN Wheezing  hydrOXYzine hydrochloride 25 milliGRAM(s) Oral every 4 hours PRN anxiety  LORazepam     Tablet 2 milliGRAM(s) Oral every 6 hours PRN Agitation  LORazepam   Injectable 2 milliGRAM(s) IntraMuscular once PRN severe agitation

## 2021-06-08 NOTE — BH INPATIENT PSYCHIATRY PROGRESS NOTE - NSDCCRITERIA_PSY_ALL_CORE
Euthymic mood and no SI or self-injurious thoughts.
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
## 2021-06-08 NOTE — BH INPATIENT PSYCHIATRY PROGRESS NOTE - NSTXPROBDCOPLK_PSY_ALL_CORE
DISCHARGE ISSUE - LACK OF APPROPRIATE OUTPATIENT SERVICES

## 2021-06-08 NOTE — BH INPATIENT PSYCHIATRY PROGRESS NOTE - NSTXDEPRESGOAL_PSY_ALL_CORE
Will identify 2 coping skills that assist in improving mood

## 2021-06-08 NOTE — BH DISCHARGE NOTE NURSING/SOCIAL WORK/PSYCH REHAB - NSCDUDCCRISIS_PSY_A_CORE
[facility]  1 (831) [facility]LL ([phone])  Text "WELL" to 32218  Website: www.[place]/.[facility] 1 (622) 191-ZGEY (1487) Website: www.[facility].org/.National Suicide Prevention Lifeline 3 (015) 724-3899/.  [facility]  1 (180) L[facility] (034-5659)/.  [facility]  [address] [place] [phone]   Hours:  Monday through Friday from 9 AM to 3 PM/.  [facility] [facility] - [facility] Crisis Line  [phone], Option 1
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
## 2022-01-28 NOTE — ED ADULT NURSE REASSESSMENT NOTE - NS ED NURSE REASSESS COMMENT FT1
pt sucessfully transported to [address] with security and pca staff. no issues noted. pt went calmly no

## 2022-07-22 NOTE — BH TREATMENT PLAN - NSTXDCOPLKINTERSW_PSY_ALL_CORE
Spoke with patient ,verified name and  patient informed of labs per tatum. Joseline Story NP looks good. Support and psychoed to be provided and all elements of the discharge plan arranged.  Patient does not have current treatment providers.

## 2022-08-23 NOTE — ED BEHAVIORAL HEALTH ASSESSMENT NOTE - NS ED BHA PLAN ADMIT TO PSYCHIATRY BH CONTACTED FT
[FreeTextEntry1] : 1. AS - Montelukast 10mg - Albuterol prn - recheck PFT\par \par 2. FA - Avoiding peanuts, tree nuts and sesame - Auvi-Q - Immunnocap\par \par 3. Chronic urticaria - Cetirizine 10mg,\par \par 4. Folliculitis - CLN wash  no provider

## 2024-03-08 NOTE — BH PATIENT PROFILE - NSVRISKSCORE_PSY_ALL_CORE
Problem: Physical Therapy  Goal: Physical Therapy Goal  Description: Goals to be met by: 24    Patient will increase functional independence with mobility by performin. Supine<>sit with SBA without use of HB features via log roll technique.   2. Sit<>stand with SBA with RW.  3. Gait x 50 feet with SBA with RW.  4. Tolerate sitting in chair x2 hours without significant increase in pain.  Outcome: Ongoing, Progressing     Patient evaluated by PT and goals established. Patient with multiple areas of pain, most notably back and R hip that worsen with mobility. Instructed in log roll technique 2/2 compression fractures with good effort by pt. Ambulated within room with RW and CGA but increased fatigue noted after short distances. PT will continue to follow and progress as tolerated. Rec for dc to Moderate Intensity Therapy. Please see progress note for detailed plan of care and recommendations.   2 Dutasteride Pregnancy And Lactation Text: This medication is absolutely contraindicated in women, especially during pregnancy and breast feeding. Feminization of male fetuses is possible if taking while pregnant.

## 2024-06-13 NOTE — DIETITIAN INITIAL EVALUATION ADULT. - OBTAIN WEEKLY WEIGHT
Pt called and said that she picked up the Ramelteon from the pharmacy but has lost the script and can't find the bottle. She knows that she can't have another one until the time for refill but she said she is having withdrawal symptoms from being on the Ambien for so long and hasn't had a chance to try to new script yet. She is wondering what she can do? Please advise and notify  
yes